# Patient Record
Sex: FEMALE | Race: WHITE | Employment: UNEMPLOYED | RURAL
[De-identification: names, ages, dates, MRNs, and addresses within clinical notes are randomized per-mention and may not be internally consistent; named-entity substitution may affect disease eponyms.]

---

## 2021-08-31 ENCOUNTER — OFFICE VISIT (OUTPATIENT)
Dept: FAMILY MEDICINE CLINIC | Age: 46
End: 2021-08-31
Payer: COMMERCIAL

## 2021-08-31 VITALS
DIASTOLIC BLOOD PRESSURE: 57 MMHG | OXYGEN SATURATION: 99 % | RESPIRATION RATE: 14 BRPM | SYSTOLIC BLOOD PRESSURE: 107 MMHG | HEART RATE: 88 BPM

## 2021-08-31 DIAGNOSIS — R11.0 NAUSEA: ICD-10-CM

## 2021-08-31 DIAGNOSIS — R82.90 ABNORMAL URINALYSIS: ICD-10-CM

## 2021-08-31 DIAGNOSIS — R10.31 RIGHT LOWER QUADRANT ABDOMINAL PAIN: Primary | ICD-10-CM

## 2021-08-31 DIAGNOSIS — R23.2 FLUSHING: ICD-10-CM

## 2021-08-31 DIAGNOSIS — L29.9 ITCHING: ICD-10-CM

## 2021-08-31 LAB
BILIRUB UR QL STRIP: NORMAL
GLUCOSE UR-MCNC: NEGATIVE MG/DL
HCG URINE, QL. (POC): NEGATIVE
KETONES P FAST UR STRIP-MCNC: NORMAL MG/DL
PH UR STRIP: 6 [PH] (ref 4.6–8)
PROT UR QL STRIP: NORMAL
SP GR UR STRIP: 1.02 (ref 1–1.03)
UA UROBILINOGEN AMB POC: NORMAL (ref 0.2–1)
URINALYSIS CLARITY POC: CLEAR
URINALYSIS COLOR POC: NORMAL
URINE BLOOD POC: NORMAL
URINE LEUKOCYTES POC: NORMAL
URINE NITRITES POC: POSITIVE
VALID INTERNAL CONTROL?: YES

## 2021-08-31 PROCEDURE — 81025 URINE PREGNANCY TEST: CPT | Performed by: FAMILY MEDICINE

## 2021-08-31 PROCEDURE — 99204 OFFICE O/P NEW MOD 45 MIN: CPT | Performed by: FAMILY MEDICINE

## 2021-08-31 PROCEDURE — 81003 URINALYSIS AUTO W/O SCOPE: CPT | Performed by: FAMILY MEDICINE

## 2021-08-31 RX ORDER — ONDANSETRON 4 MG/1
4 TABLET, ORALLY DISINTEGRATING ORAL
Qty: 30 TABLET | Refills: 0 | Status: SHIPPED | OUTPATIENT
Start: 2021-08-31

## 2021-08-31 RX ORDER — NITROFURANTOIN 25; 75 MG/1; MG/1
100 CAPSULE ORAL 2 TIMES DAILY
Qty: 14 CAPSULE | Refills: 0 | Status: SHIPPED | OUTPATIENT
Start: 2021-08-31 | End: 2021-09-07

## 2021-08-31 NOTE — PROGRESS NOTES
[facility]    History of Present Illness:   Art Khan is a 55 y.o. female with history of Hidradenitis Suppurtive, Chronic Pain  CC: Nausea, Vomiting, Stomach Pain  History provided by patient and Records    HPI:  Patient reports onset of Nausea, stomach pain, and developed stomach pains over the last hour or so. Patient reports only things different from normal routine was taking an old Amoxicillin 6 hours ago, and had a frosty immediately prior to this. Notes a few days of fatigue recently. Noting in office development of sharp right sided abdominal and reports not having had BM in the last 2-3 days. Health Maintenance  Health Maintenance Due   Topic Date Due    COVID-19 Vaccine (1) Never done    DTaP/Tdap/Td series (1 - Tdap) Never done    PAP AKA CERVICAL CYTOLOGY  Never done    Lipid Screen  Never done    Colorectal Cancer Screening Combo  Never done       Past Medical, Family, and Social History:     Current Outpatient Medications on File Prior to Visit   Medication Sig Dispense Refill    lisinopril (PRINIVIL, ZESTRIL) 10 mg tablet Take 1 Tab by mouth daily. Take one tablet daily 30 Tab 3     No current facility-administered medications on file prior to visit. Patient Active Problem List   Diagnosis Code    Vulval hidradenitis suppurativa L73.2    Chronic pain disorder G89.4    Smoking addiction F17.200    Drug abuse ([facility]) F19.10       Social History     Socioeconomic History    Marital status: SINGLE     Spouse name: Not on file    Number of children: Not on file    Years of education: Not on file    Highest education level: Not on file   Occupational History    Not on file   Tobacco Use    Smoking status: Former Smoker   Substance and Sexual Activity    Alcohol use:  Yes    Drug use: No    Sexual activity: Yes     Partners: Male   Other Topics Concern    Not on file   Social History Narrative    Not on file     Social Determinants of Health Financial Resource Strain:     Difficulty of Paying Living Expenses:    Food Insecurity:     Worried About Running Out of Food in the Last Year:     [address] in the Last Year:    Transportation Needs:     Lack of Transportation (Medical):  Lack of Transportation (Non-Medical):    Physical Activity:     Days of Exercise per Week:     Minutes of Exercise per Session:    Stress:     Feeling of Stress :    Social Connections:     Frequency of Communication with Friends and Family:     Frequency of Social Gatherings with Friends and Family:     Attends Orthodoxy Services:     Active Member of Clubs or Organizations:     Attends Club or Organization Meetings:     Marital Status:    Intimate Partner Violence:     Fear of Current or Ex-Partner:     Emotionally Abused:     Physically Abused:     Sexually Abused:        Review of Systems   Review of Systems   Constitutional: Positive for malaise/fatigue. Negative for chills and fever. HENT: Negative for sore throat. Eyes: Negative for blurred vision and double vision. Respiratory: Negative for cough. Cardiovascular: Negative for chest pain and palpitations. Gastrointestinal: Positive for abdominal pain, constipation and nausea. Negative for vomiting. Neurological: Positive for headaches. Objective:     Visit Vitals  BP (!) 107/57 (BP 1 Location: Left arm, BP Patient Position: Sitting)   Pulse 88   Resp 14   SpO2 99%        Physical Exam  Vitals and nursing note reviewed. Constitutional:       Appearance: Normal appearance. HENT:      Head: Normocephalic and atraumatic. Cardiovascular:      Rate and Rhythm: Normal rate and regular rhythm. Pulses: Normal pulses. Heart sounds: Normal heart sounds. No murmur heard. No friction rub. No gallop. Pulmonary:      Effort: Pulmonary effort is normal.      Breath sounds: Normal breath sounds. Abdominal:      General: Abdomen is flat.       Palpations: Abdomen is soft.   Musculoskeletal:         General: Normal range of motion. Cervical back: Normal range of motion and neck supple. Skin:     General: Skin is warm and dry. Comments: Bumps on the right side of head and mild flushing of face   Neurological:      General: No focal deficit present. Mental Status: She is alert and oriented to person, place, and time. Psychiatric:         Mood and Affect: Mood normal.         Behavior: Behavior normal.         Pertinent Labs/Studies:      Assessment and orders:       ICD-10-CM ICD-9-CM    1. Right lower quadrant abdominal pain  R10.31 789.03 AMB POC URINALYSIS DIP STICK AUTO W/O MICRO      AMB POC URINE PREGNANCY TEST, VISUAL COLOR COMPARISON   2. Itching  L29.9 698.9    3. Flushing  R23.2 782.62    4. Abnormal urinalysis  R82.90 791.9 CULTURE, URINE      nitrofurantoin, macrocrystal-monohydrate, (Macrobid) 100 mg capsule     Diagnoses and all orders for this visit:    1. Right lower quadrant abdominal pain  -     AMB POC URINALYSIS DIP STICK AUTO W/O MICRO  -     AMB POC URINE PREGNANCY TEST, VISUAL COLOR COMPARISON    2. Itching    3. Flushing    4. Abnormal urinalysis  -     CULTURE, URINE; Future  -     nitrofurantoin, macrocrystal-monohydrate, (Macrobid) 100 mg capsule; Take 1 Capsule by mouth two (2) times a day for 7 days. I have discussed the diagnosis with the patient and the intended plan as seen in the above orders. Social history, medical history, and labs were reviewed. The patient has received an after-visit summary and questions were answered concerning future plans. I have discussed medication side effects and warnings with the patient as well.     MD JENN Serrano & BENITO THOMSON [facility]  08/31/21

## 2021-09-03 ENCOUNTER — TELEPHONE (OUTPATIENT)
Dept: FAMILY MEDICINE CLINIC | Age: 46
End: 2021-09-03

## 2021-09-03 LAB
BACTERIA SPEC CULT: NORMAL
SERVICE CMNT-IMP: NORMAL

## 2022-12-16 ENCOUNTER — NURSE TRIAGE (OUTPATIENT)
Dept: OTHER | Facility: CLINIC | Age: 47
End: 2022-12-16

## 2022-12-16 NOTE — TELEPHONE ENCOUNTER
Location of patient: [address] [facility]  call from [place] at [facility] with [employer]. Subjective: Caller states calling to reschedule appt for today because she does not have transportation,  C/o worsening back and leg pain, slurred speech,bilat leg weakness,   Stopped taking Vistaril and Abilify 1 week ago-thought may be causing some of the above symptoms     Current Symptoms: pain in back and legs,bilateral  leg weakness,  states speech is a little slow. harder to write  Speech does not sound slurred to writer while triaging . Onset: 3 weeks ago     Associated Symptoms: NA    Pain Severity: 8/10 chronic back/leg  pain getting worse 1 week ago     Temperature: Denies     What has been tried:     LMP:         Pregnant:     Recommended disposition: Go to ED/UCC Now (Or to Office with PCP Approval)    Care advice provided, patient verbalizes understanding; denies any other questions or concerns; instructed to call back for any new or worsening symptoms. Writer provided warm transfer to Laila Gonzalez at [facility] for 2nd level triage     Attention Provider: Thank you for allowing me to participate in the care of your patient. The patient was connected to triage in response to information provided to the [facility]. Please do not respond through this encounter as the response is not directed to a shared pool.     Reason for Disposition   Patient sounds very sick or weak to the triager    Protocols used: Neurologic Deficit-ADULT-OH

## 2022-12-16 NOTE — TELEPHONE ENCOUNTER
Location of patient: [address] call from Jaimie Lynn at [facility] with [employer]. Subjective: Caller states \"slurred speech do to Abilify, vistaril she states, but states she has stopped both\" Pt has appt today but states does not have transportation calling to reschedule appt. Sent to nurse triage for red flag sx: slurred speech. Pt was talking clearly at time of call. Attempted to call pt back unable to leave voicemail    Current Symptoms: weakness, states speech is different    Reason for Disposition   Busy signal.  First attempt to contact caller. Follow-up call scheduled within 15 minutes.     Protocols used: No Contact or Duplicate Contact Call-ADULT-OH

## 2022-12-23 ENCOUNTER — OFFICE VISIT (OUTPATIENT)
Dept: FAMILY MEDICINE CLINIC | Age: 47
End: 2022-12-23
Payer: COMMERCIAL

## 2022-12-23 VITALS
SYSTOLIC BLOOD PRESSURE: 138 MMHG | WEIGHT: 150 LBS | OXYGEN SATURATION: 98 % | HEART RATE: 68 BPM | DIASTOLIC BLOOD PRESSURE: 83 MMHG | BODY MASS INDEX: 25.61 KG/M2 | HEIGHT: 64 IN | TEMPERATURE: 98.4 F | RESPIRATION RATE: 20 BRPM

## 2022-12-23 DIAGNOSIS — I10 HYPERTENSION, UNSPECIFIED TYPE: ICD-10-CM

## 2022-12-23 DIAGNOSIS — R73.03 PREDIABETES: Primary | ICD-10-CM

## 2022-12-23 DIAGNOSIS — Z12.11 ENCOUNTER FOR SCREENING FOR MALIGNANT NEOPLASM OF COLON: ICD-10-CM

## 2022-12-23 DIAGNOSIS — Z13.31 POSITIVE DEPRESSION SCREENING: ICD-10-CM

## 2022-12-23 RX ORDER — BUPRENORPHINE HYDROCHLORIDE, NALOXONE HYDROCHLORIDE 8; 2 MG/1; MG/1
FILM, SOLUBLE BUCCAL; SUBLINGUAL
COMMUNITY
Start: 2022-12-19

## 2022-12-23 RX ORDER — NALOXONE HYDROCHLORIDE 4 MG/.1ML
SPRAY NASAL
COMMUNITY
Start: 2022-12-01

## 2022-12-23 NOTE — PROGRESS NOTES
1. Have you been to the ER, urgent care clinic since your last visit? Hospitalized since your last visit? No    2. Have you seen or consulted any other health care providers outside of the [address] [place] [address] since your last visit? Include any pap smears or colon screening. No  Reviewed record in preparation for visit and have necessary documentation  Pt did not bring medication to office visit for review  opportunity was given for questions      3. For patients aged 39-70: Has the patient had a colonoscopy / FIT/ Cologuard? NA - based on age      If the patient is female:    4. For patients aged 41-77: Has the patient had a mammogram within the past 2 years? No      5. For patients aged 21-65: Has the patient had a pap smear?  No      Goals that were addressed and/or need to be completed during or after this appointment include   Health Maintenance Due   Topic Date Due    Depression Screen  Never done    COVID-19 Vaccine (1) Never done    DTaP/Tdap/Td series (1 - Tdap) Never done    Cervical cancer screen  Never done    Colorectal Cancer Screening Combo  Never done    Flu Vaccine (1) Never done

## 2022-12-23 NOTE — PROGRESS NOTES
[facility]    Assessment and Plan:  Diagnoses and all orders for this visit:    1. Prediabetes: A1c 5.9 on 11/25/22. Patient states she has been consuming more sugar since she has stopped using drugs. Counseled on diet management. 2. Encounter for screening for malignant neoplasm of colon  -     REFERRAL TO GASTROENTEROLOGY    3. Positive depression screening: Denies suicidal and homicidal ideation. Has appointment with Paul on 1/4/23. Discharged from hospital with Aripiprazole and Trazodone. Will defer management of these medications to psychiatry. Provided patient with resources for food pantry and crisis hotline number. 4. Hypertension, unspecified type: On chart review, patient has previously taken Lisinopril (2015) for hypertension. Patient states she has not taken this medication in a long time. BP stable in office today. Continue to monitor at future appointments and consider restarting as appropriate. Follow-up and Dispositions    Return in about 3 months (around 3/23/2023). Subjective:  CC: No chief complaint on file. HPI:  Carlin Aviles is 52 y.o. female who presents today for annual exam. She has history of substance use, bipolar disorder, hypertension. Patient was recently hospitalized at [facility] for psychosis on 11/23/22. She presented for auditory hallucinations and was admitted for safety risk assessment and disposition planning. She was discharged on Aripiprazole and Trazodone. She was also discharged on Suboxone. She states the only medicine she has been taking since discharge is Suboxone because the other medications make her feel sluggish. Patient denies suicidal and homicidal ideation, as well as visual and auditory hallucinations. She has been seeing Dr. Ephraim Cheung weekly for suboxone management since discharge. She has an appointment on Jan 4, 2023 with [facility] for psychiatric services.      She states she would prefer to see an OB for her pap smear and that she has appointment in January 2023. Review of Systems   Respiratory:  Negative for shortness of breath. Cardiovascular:  Negative for chest pain. Gastrointestinal:  Negative for abdominal pain. Psychiatric/Behavioral:  Negative for hallucinations and suicidal ideas. The patient is nervous/anxious. Past Medical History:   Diagnosis Date    Anxiety     Depression     Hydradenitis      Current Outpatient Medications on File Prior to Visit   Medication Sig Dispense Refill    Suboxone 8-2 mg film sublingaul film PLACE 1 AND one-half FILM UNDER THE TONGUE ONCE A DAY      naloxone (NARCAN) 4 mg/actuation nasal spray Administer 1 spray into one nostril as needed for opioid reversal or respiratory depression. Call 911. If patient does not respond, or responds and then relapses, give additional doses every 2 to 3 minutes, alternating nostrils, until medical help arrives. Use as directed. (Patient not taking: Reported on 12/23/2022)      ondansetron (ZOFRAN ODT) 4 mg disintegrating tablet Take 1 Tablet by mouth every eight (8) hours as needed for Nausea or Vomiting. (Patient not taking: Reported on 12/23/2022) 30 Tablet 0    lisinopril (PRINIVIL, ZESTRIL) 10 mg tablet Take 1 Tab by mouth daily. Take one tablet daily (Patient not taking: Reported on 12/23/2022) 30 Tab 3     No current facility-administered medications on file prior to visit. Health Maintenance Due   Topic Date Due    COVID-19 Vaccine (1) Never done    DTaP/Tdap/Td series (1 - Tdap) Never done    Cervical cancer screen  Never done    Colorectal Cancer Screening Combo  Never done    Flu Vaccine (1) Never done        Objective:    Vitals:    12/23/22 0941   BP: 138/83   Pulse: 68   Resp: 20   Temp: 98.4 °F (36.9 °C)   SpO2: 98%   Weight: 150 lb (68 kg)   Height: 5' 3.5\" (1.613 m)       Physical Exam  Vitals and nursing note reviewed. Constitutional:       General: She is not in acute distress.   Cardiovascular: Rate and Rhythm: Normal rate and regular rhythm. Heart sounds: Normal heart sounds. Pulmonary:      Effort: Pulmonary effort is normal.      Breath sounds: Normal breath sounds. Abdominal:      General: Abdomen is flat. Bowel sounds are normal.      Palpations: Abdomen is soft. Musculoskeletal:      Cervical back: Normal range of motion and neck supple. No tenderness. Lymphadenopathy:      Cervical: No cervical adenopathy. Psychiatric:         Attention and Perception: She does not perceive auditory or visual hallucinations. Mood and Affect: Mood and affect normal.         Speech: Speech normal.         Behavior: Behavior is cooperative. Thought Content: Thought content does not include homicidal or suicidal ideation. No results found for this or any previous visit (from the past 12 hour(s)). Patient's care was discussed with Dr. Mamta Solorzano. Ninfa Marina DO  Family Medicine Resident    Patient's past medical history, including but not limited to problem list, allergies, medications, social history, family history, and surgical history reviewed. I have reviewed pertinent labs results and other data. We discussed the expected course, resolution and complications of the diagnosis(es) in detail. Medication risks, benefits, costs, interactions, and alternatives were discussed as indicated. I advised her to contact the office if her condition worsens, changes or fails to improve as anticipated. She expressed understanding with the diagnosis(es) and plan.

## 2022-12-24 PROBLEM — Z86.59 HISTORY OF BIPOLAR DISORDER: Status: ACTIVE | Noted: 2022-12-24

## 2023-05-08 ENCOUNTER — NURSE TRIAGE (OUTPATIENT)
Dept: OTHER | Facility: CLINIC | Age: 48
End: 2023-05-08

## 2023-05-08 NOTE — TELEPHONE ENCOUNTER
Location of patient: [address] call from [facility] at [facility] with [employer]. Subjective: Caller states \"It's been going on for years. My right arm gets tingling/numbness and a shock. My legs (numbness) and back pain, toes burning feeling and pain. \"     Current Symptoms: toes (both feet) are numb/tingling; mid lower back pain    Denies any CP, difficulty breathing, palpitations, headache, dizziness, vision change, changes in speech or unsteady on feet. Onset: several years ago; worsening    Associated Symptoms: reduced activity, diarrhea (just this morning)    Pain Severity: 3/10; sharp; intermittent - back pain    Temperature: denies fever, + chills    What has been tried: gabapentin, suboxone, ibuprofen    LMP:  4/1/2023  Pregnant: No    Recommended disposition: See PCP within 3 Days    Care advice provided, patient verbalizes understanding; denies any other questions or concerns; instructed to call back for any new or worsening symptoms. Patient/Caller agrees with recommended disposition; writer provided warm transfer to [place] at [facility] for appointment scheduling - patient disconnected prior to transfer, Unknown Neigh to call patient back    Attention Provider: Thank you for allowing me to participate in the care of your patient. The patient was connected to triage in response to information provided to the E[facility]/PSC. Please do not respond through this encounter as the response is not directed to a shared pool.     Reason for Disposition   Numbness or tingling in one or both feet is a chronic symptom (recurrent or ongoing problem lasting > 4 weeks)    Protocols used: Neurologic Deficit-ADULT-OH

## 2023-09-22 ENCOUNTER — TELEPHONE (OUTPATIENT)
Facility: CLINIC | Age: 48
End: 2023-09-22

## 2023-09-22 NOTE — TELEPHONE ENCOUNTER
Pt has an appt on 10-2 for a HF. Pt is having reactions with the medications that [facility] put her on. Pt states she needs to be seen sooner. Please advise.

## 2023-11-14 ENCOUNTER — NURSE TRIAGE (OUTPATIENT)
Dept: OTHER | Facility: CLINIC | Age: 48
End: 2023-11-14

## 2023-11-14 NOTE — TELEPHONE ENCOUNTER
[place]    Received call from [place] at [facility] with [employer]. Subjective: Caller states \"dizziness and headaches for the past week\"     Current Symptoms: dizziness described as woozy. Intermittent headaches. One episode of dizziness last week was associated with shortness of breath and chest pain. Nothing since    Onset: 1 week ago; intermittent    Associated Symptoms: reduced activity    Pain Severity: 0/10; N/A; none    Temperature: no fevers     What has been tried: lay down or sit down    LMP:  2-3 weeks ago  Pregnant: No    Recommended disposition: See in Office Today. Pt has no transportation, would like to be seen in 3 days. Care advice provided, patient verbalizes understanding; denies any other questions or concerns; instructed to call back for any new or worsening symptoms. RN spoke to office staff at [facility] who report they will have to send a message back to provider before setting up an appointment and have MD call back. RN informed patient and encouraged patient to go to [facility] if she is able to get a ride today. Attention Provider: Thank you for allowing me to participate in the care of your patient. The patient was connected to triage in response to information provided to the ECC/PSC. Please do not respond through this encounter as the response is not directed to a shared pool.       Reason for Disposition   MODERATE dizziness (e.g., interferes with normal activities)  (Exception: Dizziness caused by heat exposure, sudden standing, or poor fluid intake.)    Protocols used: Dizziness-ADULT-OH

## 2023-11-14 NOTE — TELEPHONE ENCOUNTER
Please see triage notes. ..  pt states she does not have transportation and will not be able to be seen until the next three days. Pt would like to know if it would be ok to wait until then? Pt would like a call back, please advise. Kristine Barnes

## 2023-11-20 ENCOUNTER — NURSE TRIAGE (OUTPATIENT)
Dept: OTHER | Facility: CLINIC | Age: 48
End: 2023-11-20

## 2023-11-20 NOTE — TELEPHONE ENCOUNTER
Location of patient: [address] [facility] call from [facility] at [facility] with [employer]. Subjective: Caller states \"chills and nausea\"     Current Symptoms: nausea    Chills    Onset: today ; waxing and waning    Associated Symptoms: NA    Pain Severity: 0/10; N/A; none    Temperature: none     What has been tried: notihing    LMP: NA Pregnant: No    Recommended disposition: See in Office Today or Tomorrow    Care advice provided, patient verbalizes understanding; denies any other questions or concerns; instructed to call back for any new or worsening symptoms. Patient/Caller agrees with recommended disposition; writer provided warm transfer to [facility] at [facility] for appointment scheduling    Attention Provider: Thank you for allowing me to participate in the care of your patient. The patient was connected to triage in response to information provided to the ECC/PSC. Please do not respond through this encounter as the response is not directed to a shared pool.       Reason for Disposition   Patient wants to be seen    Protocols used: Nausea-ADULT-OH

## 2023-11-21 ENCOUNTER — OFFICE VISIT (OUTPATIENT)
Facility: CLINIC | Age: 48
End: 2023-11-21

## 2023-11-21 VITALS
WEIGHT: 151 LBS | OXYGEN SATURATION: 99 % | BODY MASS INDEX: 25.78 KG/M2 | DIASTOLIC BLOOD PRESSURE: 64 MMHG | HEIGHT: 64 IN | SYSTOLIC BLOOD PRESSURE: 108 MMHG | HEART RATE: 77 BPM | RESPIRATION RATE: 18 BRPM | TEMPERATURE: 98.6 F

## 2023-11-21 DIAGNOSIS — R42 DIZZINESS: Primary | ICD-10-CM

## 2023-11-21 DIAGNOSIS — R55 NEAR SYNCOPE: ICD-10-CM

## 2023-11-21 RX ORDER — PRAZOSIN HYDROCHLORIDE 1 MG/1
CAPSULE ORAL
COMMUNITY
Start: 2023-11-02

## 2023-11-21 RX ORDER — RISPERIDONE 0.5 MG/1
0.5 TABLET ORAL 2 TIMES DAILY
COMMUNITY
Start: 2023-11-02

## 2023-11-21 SDOH — ECONOMIC STABILITY: HOUSING INSECURITY
IN THE LAST 12 MONTHS, WAS THERE A TIME WHEN YOU DID NOT HAVE A STEADY PLACE TO SLEEP OR SLEPT IN A SHELTER (INCLUDING NOW)?: NO

## 2023-11-21 SDOH — ECONOMIC STABILITY: FOOD INSECURITY: WITHIN THE PAST 12 MONTHS, YOU WORRIED THAT YOUR FOOD WOULD RUN OUT BEFORE YOU GOT MONEY TO BUY MORE.: OFTEN TRUE

## 2023-11-21 SDOH — ECONOMIC STABILITY: FOOD INSECURITY: WITHIN THE PAST 12 MONTHS, THE FOOD YOU BOUGHT JUST DIDN'T LAST AND YOU DIDN'T HAVE MONEY TO GET MORE.: OFTEN TRUE

## 2023-11-21 SDOH — ECONOMIC STABILITY: INCOME INSECURITY: HOW HARD IS IT FOR YOU TO PAY FOR THE VERY BASICS LIKE FOOD, HOUSING, MEDICAL CARE, AND HEATING?: SOMEWHAT HARD

## 2023-11-21 ASSESSMENT — PATIENT HEALTH QUESTIONNAIRE - PHQ9
10. IF YOU CHECKED OFF ANY PROBLEMS, HOW DIFFICULT HAVE THESE PROBLEMS MADE IT FOR YOU TO DO YOUR WORK, TAKE CARE OF THINGS AT HOME, OR GET ALONG WITH OTHER PEOPLE: 2
9. THOUGHTS THAT YOU WOULD BE BETTER OFF DEAD, OR OF HURTING YOURSELF: 0
SUM OF ALL RESPONSES TO PHQ QUESTIONS 1-9: 12
6. FEELING BAD ABOUT YOURSELF - OR THAT YOU ARE A FAILURE OR HAVE LET YOURSELF OR YOUR FAMILY DOWN: 2
SUM OF ALL RESPONSES TO PHQ9 QUESTIONS 1 & 2: 3
3. TROUBLE FALLING OR STAYING ASLEEP: 2
1. LITTLE INTEREST OR PLEASURE IN DOING THINGS: 1
SUM OF ALL RESPONSES TO PHQ QUESTIONS 1-9: 12
4. FEELING TIRED OR HAVING LITTLE ENERGY: 2
8. MOVING OR SPEAKING SO SLOWLY THAT OTHER PEOPLE COULD HAVE NOTICED. OR THE OPPOSITE, BEING SO FIGETY OR RESTLESS THAT YOU HAVE BEEN MOVING AROUND A LOT MORE THAN USUAL: 2
SUM OF ALL RESPONSES TO PHQ QUESTIONS 1-9: 12
SUM OF ALL RESPONSES TO PHQ QUESTIONS 1-9: 12
2. FEELING DOWN, DEPRESSED OR HOPELESS: 2
5. POOR APPETITE OR OVEREATING: 1

## 2023-11-21 ASSESSMENT — COLUMBIA-SUICIDE SEVERITY RATING SCALE - C-SSRS
5. HAVE YOU STARTED TO WORK OUT OR WORKED OUT THE DETAILS OF HOW TO KILL YOURSELF? DO YOU INTEND TO CARRY OUT THIS PLAN?: NO
7. DID THIS OCCUR IN THE LAST THREE MONTHS: NO
3. HAVE YOU BEEN THINKING ABOUT HOW YOU MIGHT KILL YOURSELF?: NO
4. HAVE YOU HAD THESE THOUGHTS AND HAD SOME INTENTION OF ACTING ON THEM?: NO

## 2023-11-21 NOTE — PROGRESS NOTES
Chief Complaint   Patient presents with    Dizziness     While position changes. History of Present Illness:  Parish Mock is a 50 y.o. female w/ PMHx of psychotic disorder, stimulant abuse (methamphetamine), opioid use disorder, bipolar disorder, vulval hidradenitis suppurativa, chronic pain disorder, tobacco abuse who presents to clinic for evaluation of dizziness.    - Pt reports dizziness with position changes (standing up). - Feels like things are going black and need to sit down. - Started 1 week ago. - Denies any recent medication changes. - Denies any recent substance use. - Had dizziness like this \"a few years ago. \"  - Denies numbness, weakness, slurred speech, facial droop, or falls. - Stated that she was admitted to the hospital in September for two weeks in [place], [place] (thinks it was [facility]). - Reports \"I was hearing voices\" as reason for hospitalization.  - Pt states that she was prescribed Risperdal 0.5 mg BID. - She states that she was also prescribed prazosin, but has not been taking this. - Not currently seeing a psychiatrist. Trying to get into to see a psychiatrist regularly.  - PHQ-9 score 12, denies SI. Hx of substance abuse:  - Pt prescribed Suboxone. Past Medical History:   Diagnosis Date    Anxiety     Depression     Hydradenitis        Current Outpatient Medications   Medication Sig Dispense Refill    risperiDONE (RISPERDAL) 0.5 MG tablet Take 1 tablet by mouth 2 times daily      prazosin (MINIPRESS) 1 MG capsule TAKE ONE CAPSULE BY MOUTH AT BEDTIME      buprenorphine-naloxone (SUBOXONE) 8-2 MG FILM SL film PLACE 1 AND one-half FILM UNDER THE TONGUE ONCE A DAY      naloxone 4 MG/0.1ML LIQD nasal spray        No current facility-administered medications for this visit.        Allergies   Allergen Reactions    Cyclobenzaprine Other (See Comments)    Iodinated Contrast Media Hives    Iodine Hives       Social History     Tobacco Use    Smoking status: Former

## 2023-11-22 ENCOUNTER — TELEPHONE (OUTPATIENT)
Age: 48
End: 2023-11-22

## 2024-01-17 ENCOUNTER — TELEPHONE (OUTPATIENT)
Facility: CLINIC | Age: 49
End: 2024-01-17

## 2024-01-17 DIAGNOSIS — B19.20 HEPATITIS C VIRUS INFECTION WITHOUT HEPATIC COMA, UNSPECIFIED CHRONICITY: Primary | ICD-10-CM
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
## 2024-01-17 NOTE — TELEPHONE ENCOUNTER
Known Hepatitis C, will place refferal to hepatology for evaluation and treatment.  Patient with history of IV drug abuse.  Had positive Screening in 2015.  Will try to get into for treatment ASAP.    Esdras Weber MD  [facility]  01/17/24

## 2024-02-02 ENCOUNTER — TELEPHONE (OUTPATIENT)
Facility: CLINIC | Age: 49
End: 2024-02-02

## 2024-02-02 NOTE — TELEPHONE ENCOUNTER
Pt called stating [facility] faxed over paperwork for Dr. Suh to sign and fax back to them in regards to pt having oral surgery on Monday 2/5.  Pt is requesting a call back once paperwork is completed.    Please advise..

## 2024-02-02 NOTE — TELEPHONE ENCOUNTER
Patient called, notified office has not yet received this form. Notified that pre-op forms will require an appointment for completion. Verbalizes understanding. No appt made at this time.

## 2024-09-20 ENCOUNTER — OFFICE VISIT (OUTPATIENT)
Facility: CLINIC | Age: 49
End: 2024-09-20
Payer: COMMERCIAL

## 2024-09-20 VITALS
SYSTOLIC BLOOD PRESSURE: 155 MMHG | HEART RATE: 88 BPM | DIASTOLIC BLOOD PRESSURE: 88 MMHG | BODY MASS INDEX: 26.63 KG/M2 | WEIGHT: 156 LBS | OXYGEN SATURATION: 97 % | TEMPERATURE: 98 F | RESPIRATION RATE: 14 BRPM | HEIGHT: 64 IN

## 2024-09-20 DIAGNOSIS — K08.9 POOR DENTITION: ICD-10-CM

## 2024-09-20 DIAGNOSIS — J01.90 ACUTE RHINOSINUSITIS: Primary | ICD-10-CM

## 2024-09-20 DIAGNOSIS — K13.79 ORAL PAIN: ICD-10-CM

## 2024-09-20 PROCEDURE — 99213 OFFICE O/P EST LOW 20 MIN: CPT | Performed by: FAMILY MEDICINE

## 2024-09-20 RX ORDER — ARIPIPRAZOLE 10 MG/1
TABLET ORAL
COMMUNITY
Start: 2024-07-12 | End: 2024-09-20

## 2024-09-20 RX ORDER — FLUCONAZOLE 150 MG/1
150 TABLET ORAL
Qty: 2 TABLET | Refills: 0 | Status: SHIPPED | OUTPATIENT
Start: 2024-09-20 | End: 2024-09-26

## 2024-09-20 RX ORDER — LIDOCAINE HYDROCHLORIDE 20 MG/ML
10 SOLUTION OROPHARYNGEAL EVERY 4 HOURS PRN
Qty: 100 ML | Refills: 3 | Status: SHIPPED | OUTPATIENT
Start: 2024-09-20

## 2024-09-20 ASSESSMENT — ANXIETY QUESTIONNAIRES
7. FEELING AFRAID AS IF SOMETHING AWFUL MIGHT HAPPEN: MORE THAN HALF THE DAYS
5. BEING SO RESTLESS THAT IT IS HARD TO SIT STILL: SEVERAL DAYS
2. NOT BEING ABLE TO STOP OR CONTROL WORRYING: MORE THAN HALF THE DAYS
4. TROUBLE RELAXING: SEVERAL DAYS
IF YOU CHECKED OFF ANY PROBLEMS ON THIS QUESTIONNAIRE, HOW DIFFICULT HAVE THESE PROBLEMS MADE IT FOR YOU TO DO YOUR WORK, TAKE CARE OF THINGS AT HOME, OR GET ALONG WITH OTHER PEOPLE: SOMEWHAT DIFFICULT
6. BECOMING EASILY ANNOYED OR IRRITABLE: MORE THAN HALF THE DAYS
GAD7 TOTAL SCORE: 11
3. WORRYING TOO MUCH ABOUT DIFFERENT THINGS: MORE THAN HALF THE DAYS
1. FEELING NERVOUS, ANXIOUS, OR ON EDGE: SEVERAL DAYS

## 2024-09-20 ASSESSMENT — PATIENT HEALTH QUESTIONNAIRE - PHQ9
10. IF YOU CHECKED OFF ANY PROBLEMS, HOW DIFFICULT HAVE THESE PROBLEMS MADE IT FOR YOU TO DO YOUR WORK, TAKE CARE OF THINGS AT HOME, OR GET ALONG WITH OTHER PEOPLE: SOMEWHAT DIFFICULT
SUM OF ALL RESPONSES TO PHQ QUESTIONS 1-9: 11
SUM OF ALL RESPONSES TO PHQ9 QUESTIONS 1 & 2: 3
1. LITTLE INTEREST OR PLEASURE IN DOING THINGS: SEVERAL DAYS
6. FEELING BAD ABOUT YOURSELF - OR THAT YOU ARE A FAILURE OR HAVE LET YOURSELF OR YOUR FAMILY DOWN: MORE THAN HALF THE DAYS
SUM OF ALL RESPONSES TO PHQ QUESTIONS 1-9: 11
4. FEELING TIRED OR HAVING LITTLE ENERGY: SEVERAL DAYS
8. MOVING OR SPEAKING SO SLOWLY THAT OTHER PEOPLE COULD HAVE NOTICED. OR THE OPPOSITE, BEING SO FIGETY OR RESTLESS THAT YOU HAVE BEEN MOVING AROUND A LOT MORE THAN USUAL: NOT AT ALL
9. THOUGHTS THAT YOU WOULD BE BETTER OFF DEAD, OR OF HURTING YOURSELF: NOT AT ALL
SUM OF ALL RESPONSES TO PHQ QUESTIONS 1-9: 11
7. TROUBLE CONCENTRATING ON THINGS, SUCH AS READING THE NEWSPAPER OR WATCHING TELEVISION: MORE THAN HALF THE DAYS
3. TROUBLE FALLING OR STAYING ASLEEP: MORE THAN HALF THE DAYS
2. FEELING DOWN, DEPRESSED OR HOPELESS: MORE THAN HALF THE DAYS
5. POOR APPETITE OR OVEREATING: SEVERAL DAYS
SUM OF ALL RESPONSES TO PHQ QUESTIONS 1-9: 11